# Patient Record
Sex: MALE
[De-identification: names, ages, dates, MRNs, and addresses within clinical notes are randomized per-mention and may not be internally consistent; named-entity substitution may affect disease eponyms.]

---

## 2017-09-18 PROBLEM — Z00.00 ENCOUNTER FOR PREVENTIVE HEALTH EXAMINATION: Status: ACTIVE | Noted: 2017-09-18

## 2019-03-11 ENCOUNTER — APPOINTMENT (OUTPATIENT)
Dept: GASTROENTEROLOGY | Facility: CLINIC | Age: 64
End: 2019-03-11

## 2022-12-15 ENCOUNTER — NON-APPOINTMENT (OUTPATIENT)
Age: 67
End: 2022-12-15

## 2022-12-16 ENCOUNTER — APPOINTMENT (OUTPATIENT)
Dept: HOME HEALTH SERVICES | Facility: HOME HEALTH | Age: 67
End: 2022-12-16

## 2022-12-16 VITALS
HEIGHT: 65 IN | RESPIRATION RATE: 18 BRPM | DIASTOLIC BLOOD PRESSURE: 72 MMHG | HEART RATE: 54 BPM | WEIGHT: 135 LBS | SYSTOLIC BLOOD PRESSURE: 112 MMHG | OXYGEN SATURATION: 99 % | TEMPERATURE: 97.2 F | BODY MASS INDEX: 22.49 KG/M2

## 2022-12-16 DIAGNOSIS — Z80.0 FAMILY HISTORY OF MALIGNANT NEOPLASM OF DIGESTIVE ORGANS: ICD-10-CM

## 2022-12-16 DIAGNOSIS — F17.200 NICOTINE DEPENDENCE, UNSPECIFIED, UNCOMPLICATED: ICD-10-CM

## 2022-12-16 DIAGNOSIS — Z87.448 PERSONAL HISTORY OF OTHER DISEASES OF URINARY SYSTEM: ICD-10-CM

## 2022-12-16 DIAGNOSIS — K21.9 GASTRO-ESOPHAGEAL REFLUX DISEASE W/OUT ESOPHAGITIS: ICD-10-CM

## 2022-12-16 DIAGNOSIS — I73.9 PERIPHERAL VASCULAR DISEASE, UNSPECIFIED: ICD-10-CM

## 2022-12-16 DIAGNOSIS — Z23 ENCOUNTER FOR IMMUNIZATION: ICD-10-CM

## 2022-12-16 DIAGNOSIS — Z78.9 OTHER SPECIFIED HEALTH STATUS: ICD-10-CM

## 2022-12-16 PROCEDURE — 99344 HOME/RES VST NEW MOD MDM 60: CPT | Mod: 25,95

## 2022-12-16 PROCEDURE — G0506: CPT | Mod: 95

## 2022-12-19 PROBLEM — Z87.448 HISTORY OF END STAGE RENAL DISEASE: Status: RESOLVED | Noted: 2022-12-19 | Resolved: 2022-12-19

## 2022-12-19 PROBLEM — I73.9 INTERMITTENT CLAUDICATION: Status: ACTIVE | Noted: 2022-12-19

## 2022-12-19 PROBLEM — Z23 ENCOUNTER FOR IMMUNIZATION: Status: ACTIVE | Noted: 2022-12-19

## 2022-12-19 PROBLEM — K21.9 GERD (GASTROESOPHAGEAL REFLUX DISEASE): Status: ACTIVE | Noted: 2022-12-19

## 2022-12-19 RX ORDER — ISOPRPOPYL ALCOHOL 70 ML/100ML
70 SWAB TOPICAL
Qty: 100 | Refills: 0 | Status: ACTIVE | COMMUNITY
Start: 2022-11-18

## 2022-12-19 RX ORDER — AMMONIUM LACTATE 12 %
12 CREAM (GRAM) TOPICAL
Qty: 385 | Refills: 0 | Status: COMPLETED | COMMUNITY
Start: 2022-11-30

## 2022-12-19 RX ORDER — KETOCONAZOLE 20 MG/G
2 CREAM TOPICAL
Qty: 60 | Refills: 0 | Status: COMPLETED | COMMUNITY
Start: 2022-10-02

## 2022-12-19 RX ORDER — BLOOD SUGAR DIAGNOSTIC
STRIP MISCELLANEOUS
Qty: 100 | Refills: 0 | Status: ACTIVE | COMMUNITY
Start: 2022-06-30

## 2022-12-19 RX ORDER — PEN NEEDLE, DIABETIC 31 GX5/16"
32G X 4 MM NEEDLE, DISPOSABLE MISCELLANEOUS
Qty: 200 | Refills: 0 | Status: ACTIVE | COMMUNITY
Start: 2022-06-30

## 2022-12-19 RX ORDER — ALCOHOL ANTISEPTIC PADS
PADS, MEDICATED (EA) TOPICAL
Qty: 100 | Refills: 0 | Status: ACTIVE | COMMUNITY
Start: 2022-12-01

## 2022-12-19 NOTE — HEALTH RISK ASSESSMENT
[Independent] : managing medications [Some assistance needed] : managing finances [No falls in past year] : Patient reported no falls in the past year [No] : The patient does not have visual impairment [TimeGetUpGo] : 8

## 2022-12-19 NOTE — PHYSICAL EXAM
[No Acute Distress] : no acute distress [Well Developed] : well developed [Normal Outer Ear/Nose] : the ears and nose were normal in appearance [No Respiratory Distress] : no respiratory distress [Clear to Auscultation] : lungs were clear to auscultation bilaterally [Normal S1, S2] : normal S1 and S2 [No Edema] : there was no peripheral edema [Normal Bowel Sounds] : normal bowel sounds [No Rash] : no rash [No Skin Lesions] : no skin lesions [Oriented x3] : oriented to person, place, and time [Normal Mood] : the mood was normal [de-identified] : Vitals taken by RN and reviewed. Physical exam performed by RN  [de-identified] : RUE AVF with thrill +

## 2022-12-19 NOTE — HISTORY OF PRESENT ILLNESS
[Patient] : patient [FreeTextEntry1] : n/a [FreeTextEntry2] : CODIE LOPEZ is being seen for a visit provided via [employer] real-time audio visual technology. CODIE LOPEZ was located at their home, [address] [place] [address], [address] [place] [address], at the time of the visit along with RN, Hillary Neri. \par \par The [facility] clinician, BASSAM COLINDRES, was located remotely at their home in [place] at the time of the visit. The patient, CODIE LOPEZ, and the [facility] clinician, BASSAM COLINDRES, participated in the telehealth encounter. Other participants included the RN, who was in the home with the patient, performed vitals monitoring and physical exam, and facilitated the video visit with BASSAM COLINDRES. The assessment and plan was made on the basis of the information provided by the RN\par \par \par CODIE LOPEZ is a Telugu speaking 67 year male with PMH of BPH, overactive bladder, GERD, Vitamin D deficiency, thrombocytopenia, HTN, DM and ESRD (s/p renal transplant ) being seen for initial evaluation. Patient is concerned about his urinary frequency. He was seen by urology and had extensive testing. He was prescribed medications but he feels its not working. He denies any fever, dysuria but reports nocturia and incomplete voiding. \par \par Interval Events: none\par \par Subjective:\par -Appetite/weight: appetite fait. Weight stable.\par -Gait/falls: Gait stable. No falls. \par -Pain: Bilateral hip pain radiating to knee and back pain leads to decreased ambulation. Occurs with ambulation. Improves with rest. No prior accidents. No imaging. Tylenol prn but doesn't relieve. \par -Sleep: sleeps wells. \par -BMs: regular. no straining. \par -Urine: nocturia, incomplete voiding. Urology evaluated, tests were done and meds prescribed. Needs referral to another urology. \par -Skin: intact \par -DME: none \par -Mood/memory: mood and memory is good. \par -Communication: conversational. speaks Yi. minimal medical literacy \par -Health Maintenance: colonoscopy 2022. vaccines UTD \par -Hospitalizations in the past year: none \par \par \par Medical Issues:\par a) BPH: Chronic continue finasteride and Tamsulosin. \par b) Overactive bladder: Evaluated by urology and had workup. Patient prescribed Myrbetriq and Oxybutinin but feels like its not working. Would like referral to another urology. Given. \par c) Claudication??: based on symptoms description. May need COLTNO and vascular evaluation. \par d) ESRD (s/p renal transplant 2018): Follows with nephrology. Continue Mycophenolate and Tacrolimus. \par e) HTN: Chronic. Continue amlodipine and Losartan.\par f) GERD: Continue pantoprazole. \par g) DM:  Insulin dependent. Unknown A1c. No BG log to review. Continue Basaglar and Novolog. \par h) HLD: Chronic. Continue Rosuvastatin \par \par Specialists:\par Transplant-Dr Shari Castro ([phone])\par PCP- Dr Rina Garcia ([phone]) \par [phone])\par \par Social hx: lives with son, , had a son  3 years ago, worked in kitchen doesn't work now. \par Caregivers: none \par MOLST: DNR, DNI, Hospitalize, IV and antibiotics ok, no feeding tube\par HCP:  Ludwig Lopez (son) \par

## 2022-12-19 NOTE — REASON FOR VISIT
[Initial Eval - Existing Diagnosis] : an initial evaluation of an existing diagnosis [[employer] ] : provided by [employer]   [Time Spent: ____ minutes] : Total time spent using  services: [unfilled] minutes. The patient's primary language is not English thus required  services. [Pre-Visit Preparation] : pre-visit preparation was done [Intercurrent Specialty/Sub-specialty Visits] : the patient has intercurrent specialty/sub-specialty visits [Interpreters_IDNumber] : [id number] [Interpreters_FullName] : Enrrique  [FreeTextEntry2] : medical records [FreeTextEntry3] : urology

## 2022-12-19 NOTE — COUNSELING
[Normal Weight - ( BMI  <25 )] : normal weight - ( BMI  <25 ) [DASH diet recommended] : DASH diet recommended [Smoker, not interested in quitting] : smoker, not interested in quitting [Use assistive device to avoid falls] : use assistive device to avoid falls [Remove clutter and unsafe carpeting to avoid falls] : remove clutter and unsafe carpeting to avoid falls [Date: ___] : colonoscopy completed on [unfilled] [] : foot exam [Completed] : Aspirin use discussion completed [Improve pain control] : improve pain control [Maintain functional ability] : maintain functional ability [Advanced Directives discussed: ____] : Advanced directives discussed: [unfilled] [Completed Medical Orders for Life-Sustaining Treatment] : completed medical orders for life-sustaining treatment [DNR] : Code Status: DNR [Limited] : Treatment Guidelines: Limited [DNI] : Intubation: DNI [[facility]: _____] [facility] [unfilled] [_____] : HCP: [unfilled] [ - New patient with 2 or more chronic conditions; CCM discussed and patient-centered care plan established] : New patient with 2 or more chronic conditions; CCM discussed and patient-centered care plan established

## 2022-12-20 DIAGNOSIS — G89.29 PAIN IN LEG, UNSPECIFIED: ICD-10-CM

## 2022-12-20 DIAGNOSIS — R26.81 UNSTEADINESS ON FEET: ICD-10-CM

## 2022-12-20 DIAGNOSIS — M79.606 PAIN IN LEG, UNSPECIFIED: ICD-10-CM

## 2022-12-22 ENCOUNTER — APPOINTMENT (OUTPATIENT)
Dept: UROLOGY | Facility: CLINIC | Age: 67
End: 2022-12-22

## 2022-12-22 PROCEDURE — 99204 OFFICE O/P NEW MOD 45 MIN: CPT

## 2022-12-27 NOTE — HISTORY OF PRESENT ILLNESS
[FreeTextEntry1] : cc bph\par pt is  yo male referred for bph . The patient reports frequency x 10 nocturia x8\par  . He denies straining  hematuria and dysuria  The patient states symptoms are of  sever severity. The symptoms have been present for years  . He reports a history of no complicating symptoms. He denies flank pain, gross hematuria, kidney stones and recurrent UTI.\par  Prior treatments include .tamsulosin/finasteride/oxybutini/myrbettriq\par last psa unknown\par The is no family history of prostate cancer\par h/o esrd d/p tx 2018\par no constipation only decaf tea

## 2022-12-28 LAB
APPEARANCE: CLEAR
BACTERIA UR CULT: NORMAL
BACTERIA: NEGATIVE
BILIRUBIN URINE: NEGATIVE
BLOOD URINE: NEGATIVE
COLOR: YELLOW
GLUCOSE QUALITATIVE U: NEGATIVE
HYALINE CASTS: 0 /LPF
KETONES URINE: NEGATIVE
LEUKOCYTE ESTERASE URINE: NEGATIVE
MICROSCOPIC-UA: NORMAL
NITRITE URINE: NEGATIVE
PH URINE: 5.5
PROTEIN URINE: ABNORMAL
RED BLOOD CELLS URINE: 0 /HPF
SPECIFIC GRAVITY URINE: 1.02
SQUAMOUS EPITHELIAL CELLS: 0 /HPF
UROBILINOGEN URINE: NORMAL
WHITE BLOOD CELLS URINE: 0 /HPF

## 2022-12-29 ENCOUNTER — APPOINTMENT (OUTPATIENT)
Dept: UROLOGY | Facility: CLINIC | Age: 67
End: 2022-12-29

## 2022-12-29 VITALS
TEMPERATURE: 98.7 F | HEART RATE: 57 BPM | BODY MASS INDEX: 22.16 KG/M2 | HEIGHT: 65 IN | SYSTOLIC BLOOD PRESSURE: 136 MMHG | DIASTOLIC BLOOD PRESSURE: 63 MMHG | OXYGEN SATURATION: 99 % | WEIGHT: 133 LBS

## 2022-12-29 PROCEDURE — 52000 CYSTOURETHROSCOPY: CPT

## 2023-01-19 ENCOUNTER — APPOINTMENT (OUTPATIENT)
Dept: UROLOGY | Facility: CLINIC | Age: 68
End: 2023-01-19
Payer: MEDICARE

## 2023-01-19 VITALS
TEMPERATURE: 97.9 F | DIASTOLIC BLOOD PRESSURE: 76 MMHG | HEIGHT: 65 IN | WEIGHT: 133 LBS | BODY MASS INDEX: 22.16 KG/M2 | HEART RATE: 68 BPM | SYSTOLIC BLOOD PRESSURE: 146 MMHG

## 2023-01-19 PROCEDURE — 51736 URINE FLOW MEASUREMENT: CPT

## 2023-01-19 PROCEDURE — 51728 CYSTOMETROGRAM W/VP: CPT

## 2023-01-19 PROCEDURE — 51784 ANAL/URINARY MUSCLE STUDY: CPT

## 2023-01-19 PROCEDURE — 51797 INTRAABDOMINAL PRESSURE TEST: CPT

## 2023-01-23 ENCOUNTER — APPOINTMENT (OUTPATIENT)
Dept: UROLOGY | Facility: CLINIC | Age: 68
End: 2023-01-23
Payer: MEDICARE

## 2023-01-23 PROCEDURE — 81003 URINALYSIS AUTO W/O SCOPE: CPT | Mod: QW

## 2023-01-23 PROCEDURE — 99215 OFFICE O/P EST HI 40 MIN: CPT

## 2023-01-23 NOTE — HISTORY OF PRESENT ILLNESS
[FreeTextEntry1] : 67M presents for initial evaluation of LUTS \par Referred by Dr. Drew \par  \par PMH significant for: DM (a1c: <7), HTN, HLD, claudication \par PSH significant for: Renal transplant \par Significant meds: insulin, MMR, tamsulosin, finasteride, myrbetriq \par \par Patient reports 3 year-long history of OAB\par Reports severe level of distress \par Associated with urgency, frequency \par Previously treated with:\par      flomax + finasteride - no effect\par       Myrbetriq - marginal effect\par       oxybutynin - marginal effect \par \par Cysto (12/29, Tare): WNL \par UDS (1/19, Tare): limited study, DO, capacity 142, unable to comment on COE \par \par Daytime Frequency: 15\par Nighttime Frequency: 9-10\par Pads per day: 0\par Straining to void: 0\par Intermittency: No\par Dribbling: No\par Subjective Incomplete Emptying: frequently \par UTIs this past year: 0\par \par Daily Fluid Total: 40 oz\par Daily Caffeine Total: 0 oz\par \par Neurologic Hx, Vision or Balance changes: No\par Erections: WNL\par \par IPSS: 18, almost completely irritative\par QOL: 5, unhappy\par \par \par

## 2023-01-23 NOTE — ASSESSMENT
[FreeTextEntry1] : Mr. BARNES has seen me today for an evaluation of overactive bladder (OAB) (urinary urgency, frequency, and urge incontinence).  We have extensively reviewed the OAB care pathway and discussed: \par \par 1st line therapy:  \par       -Behavioral therapy -limiting fluid intake, caffeine, and artificial sweeteners; maintaining a normal weight \par       -Medical comorbidities - managing sleep apnea, diabetes, lower extremity edema, and CHF if present  \par       -Conservative management options - observation, pelvic floor physical therapy \par \par 2nd line therapy:  \par       -Medications (anticholinergics, Beta-agonists)\par  \par 3rd line therapy:  \par       -Bladder botulinum toxin injection \par       -Sacral neuromodulation \par       -Tibial nerve stimulation. \par \par The patient has exhibited at least 1 year of urinary urgency, frequency despite behavioral modification and trials with 2 medications: oxybutynin, Myrbetriq The patient would like to proceed with Bladder Botox. RBA discussed. All questions answered\par \par \par

## 2023-01-24 LAB
BILIRUB UR QL STRIP: NEGATIVE
CLARITY UR: CLEAR
GLUCOSE UR-MCNC: NEGATIVE
HCG UR QL: 5 EU/DL
HGB UR QL STRIP.AUTO: NEGATIVE
KETONES UR-MCNC: NEGATIVE
LEUKOCYTE ESTERASE UR QL STRIP: NEGATIVE
NITRITE UR QL STRIP: NEGATIVE
PH UR STRIP: 5
PROT UR STRIP-MCNC: NEGATIVE
SP GR UR STRIP: 1.02

## 2023-02-06 ENCOUNTER — APPOINTMENT (OUTPATIENT)
Dept: UROLOGY | Facility: CLINIC | Age: 68
End: 2023-02-06
Payer: MEDICARE

## 2023-02-06 VITALS
BODY MASS INDEX: 22.16 KG/M2 | TEMPERATURE: 98.7 F | DIASTOLIC BLOOD PRESSURE: 71 MMHG | OXYGEN SATURATION: 99 % | HEART RATE: 66 BPM | WEIGHT: 133 LBS | SYSTOLIC BLOOD PRESSURE: 146 MMHG | HEIGHT: 65 IN

## 2023-02-06 PROCEDURE — 52287 CYSTOSCOPY CHEMODENERVATION: CPT

## 2023-03-06 ENCOUNTER — APPOINTMENT (OUTPATIENT)
Dept: UROLOGY | Facility: CLINIC | Age: 68
End: 2023-03-06
Payer: MEDICARE

## 2023-03-06 VITALS
HEART RATE: 64 BPM | DIASTOLIC BLOOD PRESSURE: 60 MMHG | HEIGHT: 65 IN | WEIGHT: 133 LBS | TEMPERATURE: 97.9 F | SYSTOLIC BLOOD PRESSURE: 104 MMHG | OXYGEN SATURATION: 97 % | BODY MASS INDEX: 22.16 KG/M2

## 2023-03-06 DIAGNOSIS — N52.9 MALE ERECTILE DYSFUNCTION, UNSPECIFIED: ICD-10-CM

## 2023-03-06 PROCEDURE — 99214 OFFICE O/P EST MOD 30 MIN: CPT

## 2023-03-06 PROCEDURE — 51798 US URINE CAPACITY MEASURE: CPT

## 2023-03-06 NOTE — PHYSICAL EXAM
[General Appearance - Well Developed] : well developed [General Appearance - Well Nourished] : well nourished [Abdomen Soft] : soft [Abdomen Tenderness] : non-tender [Costovertebral Angle Tenderness] : no ~M costovertebral angle tenderness

## 2023-03-06 NOTE — ASSESSMENT
[FreeTextEntry1] : Mr. Lopez presents for follow up\par \par h/o OAB\par Managed with Myrbetriq + Bladder Botox 100 u\par 70% improvement, no AE, happy with results\par \par h/o ED\par Previously used unknown PDE5i w/o complaints\par No nitrate use\par Discussed RBA or PDE5i at length. Reviewed the medications amplify but do not induce and erection. Patient should take it 30 mins before sexual activity in the absence of a heavy meal beforehand. Risks include HA, myalgia, flushing, GI upset, and vision changes. If patient obtain an erection that does not go away, he will need to dasha. the office or proceed to the nearest ED.\par \par Recommendations\par -Screening PSA\par -d/c Myrbetriq - stay off medication if no deterioration of sxs\par -start sildenafil 100 mg PRN\par -RTC 3 mo \par \par

## 2023-03-06 NOTE — HISTORY OF PRESENT ILLNESS
[FreeTextEntry1] : 67M presents for follow up evaluation \par \par h/o OAB s/p 100u botox on 2/6 \par Daytime Frequency: 15--> 10\par Nighttime Frequency: 9-10--> 2-3\par continues to take myrbetriq 50\par Reports >70% improvement in symptoms\par \par Happy with results \par Denies adverse effects\par Voiding well w/o difficulty\par PVR: 90\par \par h/o ED\par Reports inability to achieve erection satisfactory for penetration w/o meds\par Previous PDE5i medication use with good effect, no AE\par No significant cardiac hx or contraindication to sex

## 2023-05-31 ENCOUNTER — NON-APPOINTMENT (OUTPATIENT)
Age: 68
End: 2023-05-31

## 2023-06-08 ENCOUNTER — APPOINTMENT (OUTPATIENT)
Dept: UROLOGY | Facility: CLINIC | Age: 68
End: 2023-06-08

## 2023-08-22 ENCOUNTER — APPOINTMENT (OUTPATIENT)
Dept: HOME HEALTH SERVICES | Facility: HOME HEALTH | Age: 68
End: 2023-08-22
Payer: MEDICARE

## 2023-08-22 VITALS
HEART RATE: 62 BPM | OXYGEN SATURATION: 98 % | SYSTOLIC BLOOD PRESSURE: 110 MMHG | TEMPERATURE: 98.6 F | DIASTOLIC BLOOD PRESSURE: 70 MMHG | RESPIRATION RATE: 18 BRPM

## 2023-08-22 DIAGNOSIS — N32.81 OVERACTIVE BLADDER: ICD-10-CM

## 2023-08-22 PROCEDURE — 99342 HOME/RES VST NEW LOW MDM 30: CPT | Mod: 25

## 2023-08-22 RX ORDER — FLUCONAZOLE 100 MG/1
100 TABLET ORAL
Qty: 7 | Refills: 0 | Status: COMPLETED | COMMUNITY
Start: 2022-12-12 | End: 2023-08-22

## 2023-08-22 RX ORDER — ROSUVASTATIN CALCIUM 5 MG/1
5 TABLET, FILM COATED ORAL
Qty: 90 | Refills: 0 | Status: COMPLETED | COMMUNITY
Start: 2022-12-02 | End: 2023-08-22

## 2023-08-22 RX ORDER — OXYBUTYNIN CHLORIDE 15 MG/1
15 TABLET, EXTENDED RELEASE ORAL
Qty: 90 | Refills: 3 | Status: COMPLETED | COMMUNITY
Start: 2022-12-16 | End: 2023-08-22

## 2023-08-22 NOTE — REASON FOR VISIT
[Follow-Up] : a follow-up visit [[employer] ] : provided by [employer]   [Time Spent: ____ minutes] : Total time spent using  services: [unfilled] minutes. The patient's primary language is not English thus required  services. [Pre-Visit Preparation] : pre-visit preparation was done [Intercurrent Specialty/Sub-specialty Visits] : the patient has intercurrent specialty/sub-specialty visits [Interpreters_IDNumber] : [id number] [Interpreters_FullName] : Alfreda [FreeTextEntry2] : Medical Record [FreeTextEntry3] : PCP and Nephrologist

## 2023-08-22 NOTE — HEALTH RISK ASSESSMENT
[Some assistance needed] : managing finances [No falls in past year] : Patient reported no falls in the past year [No] : The patient does not have visual impairment [Independent] : shopping [TimeGetUpGo] : 5

## 2023-08-22 NOTE — HISTORY OF PRESENT ILLNESS
[Patient] : patient [[facility] Co-Management Patient] : [unfilled] is a [facility] co-management patient [Patient is stable] : patient is stable [Education provided] : education provided [LastPCPVisitDate] : Last week [FreeTextEntry4] :  Nephrologist Dr Shari Castro  [LastSpecialistVisitDate] : 2 weeks ago [FreeTextEntry1] : n/a [FreeTextEntry2] : PMH of BPH, overactive bladder, GERD, Vitamin D deficiency, thrombocytopenia, HTN, DM and ESRD (s/p renal transplant 2018) , Right AVF + Bruit, + Thrill.  Interval Events: none  Subjective: -Appetite/weight: appetite good. Weight stable. -Gait/falls: Gait stable. No falls.  -Pain: denies -Sleep: sleeps wells.  -BMs: regular.  -Urine: no issues. -Skin: intact  -DME: none  -Mood/memory: mood and memory good.  -Communication: conversational. speaks Swedish. minimal medical literacy  -Health Maintenance: colonoscopy 2022. vaccines UTD  -Hospitalizations in the past year: none    Medical Issues: a) BPH: Chronic continue finasteride and Tamsulosin.  b) Overactive bladder: on Myrbetriq and Oxybutinin.  c) Claudication??: based on symptoms description. May need COLTON and vascular evaluation. Pt not sure if test was done, d) ESRD (s/p renal transplant 2018): Follows with nephrology. on Mycophenolate and Tacrolimus.  e) HTN: Chronic. on Amlodipine and Losartan. f) GERD: on pantoprazole.  g) DM:  Insulin dependent. Unknown A1c. No BG log to review. Continue Basaglar and Novolog. BS  h) HLD: Chronic. on Atorvastatin e) Chronic Back pain- On Gabapentin with good relief.  Specialists: Transplant-Dr Shari Castro ([phone]) PCP- Dr Rina Garcia ([phone])  Hematologist-([phone])  Social hx: lives with son, , had a son  3 years ago. Caregivers: none  MOLST: DNR, DNI, Hospitalize, IV and antibiotics ok, no feeding tube. HCP:  uLdwig Lopez (son)

## 2023-08-22 NOTE — COUNSELING
[Normal Weight - ( BMI  <25 )] : normal weight - ( BMI  <25 ) [DASH diet recommended] : DASH diet recommended [Smoker, not interested in quitting] : smoker, not interested in quitting [Use assistive device to avoid falls] : use assistive device to avoid falls [Remove clutter and unsafe carpeting to avoid falls] : remove clutter and unsafe carpeting to avoid falls [Date: ___] : colonoscopy completed on [unfilled] [] : foot exam [Completed] : Aspirin use discussion completed [Improve pain control] : improve pain control [Maintain functional ability] : maintain functional ability [Advanced Directives discussed: ____] : Advanced directives discussed: [unfilled] [Completed Medical Orders for Life-Sustaining Treatment] : completed medical orders for life-sustaining treatment [DNR] : Code Status: DNR [Limited] : Treatment Guidelines: Limited [DNI] : Intubation: DNI [Last Verification Date: _____] : [facility]/last verification date: [unfilled] [_____] : HCP: [unfilled] [FreeTextEntry3] : Renal diet, Const Carb diet

## 2023-10-11 ENCOUNTER — NON-APPOINTMENT (OUTPATIENT)
Age: 68
End: 2023-10-11

## 2023-11-29 ENCOUNTER — APPOINTMENT (OUTPATIENT)
Dept: HOME HEALTH SERVICES | Facility: HOME HEALTH | Age: 68
End: 2023-11-29
Payer: MEDICAID

## 2023-11-29 VITALS
OXYGEN SATURATION: 97 % | RESPIRATION RATE: 16 BRPM | TEMPERATURE: 98.06 F | DIASTOLIC BLOOD PRESSURE: 60 MMHG | SYSTOLIC BLOOD PRESSURE: 100 MMHG | HEART RATE: 70 BPM

## 2023-11-29 DIAGNOSIS — E78.5 HYPERLIPIDEMIA, UNSPECIFIED: ICD-10-CM

## 2023-11-29 DIAGNOSIS — N40.0 BENIGN PROSTATIC HYPERPLASIA WITHOUT LOWER URINARY TRACT SYMPMS: ICD-10-CM

## 2023-11-29 DIAGNOSIS — Z94.0 KIDNEY TRANSPLANT STATUS: ICD-10-CM

## 2023-11-29 DIAGNOSIS — E11.9 TYPE 2 DIABETES MELLITUS W/OUT COMPLICATIONS: ICD-10-CM

## 2023-11-29 DIAGNOSIS — Z79.4 TYPE 2 DIABETES MELLITUS W/OUT COMPLICATIONS: ICD-10-CM

## 2023-11-29 DIAGNOSIS — I10 ESSENTIAL (PRIMARY) HYPERTENSION: ICD-10-CM

## 2023-11-29 PROCEDURE — 99349 HOME/RES VST EST MOD MDM 40: CPT

## 2023-11-30 ENCOUNTER — NON-APPOINTMENT (OUTPATIENT)
Age: 68
End: 2023-11-30

## 2023-11-30 ENCOUNTER — TRANSCRIPTION ENCOUNTER (OUTPATIENT)
Age: 68
End: 2023-11-30

## 2023-12-03 ENCOUNTER — NON-APPOINTMENT (OUTPATIENT)
Age: 68
End: 2023-12-03

## 2023-12-03 PROBLEM — E78.5 HYPERLIPIDEMIA: Status: ACTIVE | Noted: 2022-12-19

## 2023-12-03 PROBLEM — Z94.0 RENAL TRANSPLANT, STATUS POST: Status: ACTIVE | Noted: 2022-12-19

## 2023-12-03 PROBLEM — N40.0 BPH WITHOUT URINARY OBSTRUCTION: Status: ACTIVE | Noted: 2022-12-19

## 2023-12-03 PROBLEM — E11.9 TYPE 2 DIABETES MELLITUS WITHOUT COMPLICATION, WITH LONG-TERM CURRENT USE OF INSULIN: Status: ACTIVE | Noted: 2022-12-19

## 2023-12-03 PROBLEM — I10 HYPERTENSION, UNSPECIFIED TYPE: Status: ACTIVE | Noted: 2022-12-19

## 2023-12-03 RX ORDER — MIRABEGRON 50 MG/1
50 TABLET, FILM COATED, EXTENDED RELEASE ORAL
Qty: 30 | Refills: 5 | Status: ACTIVE | COMMUNITY
Start: 2022-12-16

## 2023-12-03 RX ORDER — CLOTRIMAZOLE 10 MG/1
10 LOZENGE ORAL
Qty: 35 | Refills: 0 | Status: COMPLETED | COMMUNITY
Start: 2022-12-12 | End: 2023-12-03

## 2023-12-03 RX ORDER — TAMSULOSIN HYDROCHLORIDE 0.4 MG/1
0.4 CAPSULE ORAL
Qty: 1 | Refills: 3 | Status: ACTIVE | COMMUNITY
Start: 2022-12-16

## 2023-12-03 RX ORDER — AMLODIPINE BESYLATE 5 MG/1
5 TABLET ORAL
Qty: 90 | Refills: 3 | Status: ACTIVE | COMMUNITY
Start: 2022-12-12

## 2023-12-03 RX ORDER — FINASTERIDE 5 MG/1
5 TABLET, FILM COATED ORAL DAILY
Qty: 90 | Refills: 3 | Status: ACTIVE | COMMUNITY
Start: 2022-12-16

## 2023-12-03 RX ORDER — INSULIN GLARGINE 100 [IU]/ML
100 INJECTION, SOLUTION SUBCUTANEOUS
Qty: 30 | Refills: 3 | Status: ACTIVE | COMMUNITY
Start: 2022-10-14

## 2023-12-03 RX ORDER — ERGOCALCIFEROL 1.25 MG/1
1.25 MG CAPSULE, LIQUID FILLED ORAL
Qty: 8 | Refills: 0 | Status: ACTIVE | COMMUNITY
Start: 2022-12-16

## 2023-12-03 RX ORDER — ONABOTULINUMTOXINA 100 [USP'U]/1
100 INJECTION, POWDER, LYOPHILIZED, FOR SOLUTION INTRADERMAL; INTRAMUSCULAR
Qty: 1 | Refills: 0 | Status: COMPLETED | COMMUNITY
Start: 2023-01-23 | End: 2023-12-03

## 2023-12-03 RX ORDER — MYCOPHENOLATE MOFETIL 250 MG/1
250 CAPSULE ORAL
Refills: 0 | Status: ACTIVE | COMMUNITY
Start: 2022-12-16

## 2023-12-03 RX ORDER — LOSARTAN POTASSIUM 50 MG/1
50 TABLET, FILM COATED ORAL
Qty: 90 | Refills: 3 | Status: ACTIVE | COMMUNITY
Start: 2022-12-16

## 2023-12-03 RX ORDER — SODIUM POLYSTYRENE SULFONATE 15 G/60ML
15 SUSPENSION ORAL; RECTAL
Qty: 473 | Refills: 0 | Status: COMPLETED | COMMUNITY
Start: 2022-12-13 | End: 2023-12-03

## 2023-12-03 RX ORDER — INSULIN ASPART 100 [IU]/ML
100 INJECTION, SOLUTION INTRAVENOUS; SUBCUTANEOUS
Refills: 0 | Status: ACTIVE | COMMUNITY
Start: 2022-06-30

## 2023-12-03 RX ORDER — SILDENAFIL 100 MG/1
100 TABLET, FILM COATED ORAL
Qty: 30 | Refills: 3 | Status: COMPLETED | COMMUNITY
Start: 2023-03-06 | End: 2023-12-03

## 2023-12-03 RX ORDER — ATORVASTATIN CALCIUM 10 MG/1
10 TABLET, FILM COATED ORAL
Refills: 0 | Status: ACTIVE | COMMUNITY
Start: 2023-08-22

## 2023-12-03 RX ORDER — TACROLIMUS 1 MG/1
1 CAPSULE ORAL TWICE DAILY
Refills: 0 | Status: ACTIVE | COMMUNITY
Start: 2022-12-16

## 2023-12-03 RX ORDER — PANTOPRAZOLE 40 MG/1
40 TABLET, DELAYED RELEASE ORAL DAILY
Qty: 1 | Refills: 3 | Status: COMPLETED | COMMUNITY
Start: 2022-12-16 | End: 2023-12-03

## 2023-12-03 RX ORDER — GABAPENTIN 100 MG/1
100 CAPSULE ORAL 3 TIMES DAILY
Refills: 0 | Status: ACTIVE | COMMUNITY
Start: 2023-08-22

## 2024-03-29 ENCOUNTER — APPOINTMENT (OUTPATIENT)
Dept: HOME HEALTH SERVICES | Facility: HOME HEALTH | Age: 69
End: 2024-03-29

## 2024-06-21 ENCOUNTER — APPOINTMENT (OUTPATIENT)
Dept: HOME HEALTH SERVICES | Facility: HOME HEALTH | Age: 69
End: 2024-06-21

## 2024-06-21 ENCOUNTER — NON-APPOINTMENT (OUTPATIENT)
Age: 69
End: 2024-06-21

## 2024-11-20 ENCOUNTER — APPOINTMENT (OUTPATIENT)
Dept: HOME HEALTH SERVICES | Facility: HOME HEALTH | Age: 69
End: 2024-11-20
Payer: MEDICARE

## 2024-11-20 VITALS
HEART RATE: 60 BPM | DIASTOLIC BLOOD PRESSURE: 70 MMHG | WEIGHT: 136 LBS | RESPIRATION RATE: 16 BRPM | HEIGHT: 65 IN | BODY MASS INDEX: 22.66 KG/M2 | TEMPERATURE: 98.24 F | OXYGEN SATURATION: 99 % | SYSTOLIC BLOOD PRESSURE: 122 MMHG

## 2024-11-20 DIAGNOSIS — N40.0 BENIGN PROSTATIC HYPERPLASIA WITHOUT LOWER URINARY TRACT SYMPMS: ICD-10-CM

## 2024-11-20 DIAGNOSIS — I10 ESSENTIAL (PRIMARY) HYPERTENSION: ICD-10-CM

## 2024-11-20 DIAGNOSIS — I73.9 PERIPHERAL VASCULAR DISEASE, UNSPECIFIED: ICD-10-CM

## 2024-11-20 DIAGNOSIS — Z79.4 TYPE 2 DIABETES MELLITUS W/OUT COMPLICATIONS: ICD-10-CM

## 2024-11-20 DIAGNOSIS — E11.9 TYPE 2 DIABETES MELLITUS W/OUT COMPLICATIONS: ICD-10-CM

## 2024-11-20 PROCEDURE — 99349 HOME/RES VST EST MOD MDM 40: CPT | Mod: 25

## 2024-11-20 RX ORDER — HYDROCHLOROTHIAZIDE 12.5 MG/1
12.5 CAPSULE ORAL DAILY
Qty: 90 | Refills: 3 | Status: ACTIVE | COMMUNITY
Start: 2024-11-20

## 2024-11-20 RX ORDER — NIFEDIPINE 90 MG/1
90 TABLET, EXTENDED RELEASE ORAL DAILY
Refills: 0 | Status: ACTIVE | COMMUNITY
Start: 2024-11-20

## 2024-12-13 ENCOUNTER — APPOINTMENT (OUTPATIENT)
Dept: HOME HEALTH SERVICES | Facility: HOME HEALTH | Age: 69
End: 2024-12-13

## 2024-12-17 ENCOUNTER — APPOINTMENT (OUTPATIENT)
Dept: HOME HEALTH SERVICES | Facility: HOME HEALTH | Age: 69
End: 2024-12-17

## 2024-12-24 ENCOUNTER — NON-APPOINTMENT (OUTPATIENT)
Age: 69
End: 2024-12-24

## 2024-12-31 ENCOUNTER — APPOINTMENT (OUTPATIENT)
Dept: HOME HEALTH SERVICES | Facility: HOME HEALTH | Age: 69
End: 2024-12-31

## 2025-01-08 ENCOUNTER — APPOINTMENT (OUTPATIENT)
Dept: HOME HEALTH SERVICES | Facility: HOME HEALTH | Age: 70
End: 2025-01-08

## 2025-01-08 VITALS
HEART RATE: 66 BPM | OXYGEN SATURATION: 18 % | TEMPERATURE: 208.4 F | SYSTOLIC BLOOD PRESSURE: 100 MMHG | DIASTOLIC BLOOD PRESSURE: 60 MMHG

## 2025-01-08 DIAGNOSIS — I73.9 PERIPHERAL VASCULAR DISEASE, UNSPECIFIED: ICD-10-CM

## 2025-01-08 DIAGNOSIS — E11.9 TYPE 2 DIABETES MELLITUS W/OUT COMPLICATIONS: ICD-10-CM

## 2025-01-08 DIAGNOSIS — Z79.4 TYPE 2 DIABETES MELLITUS W/OUT COMPLICATIONS: ICD-10-CM

## 2025-01-08 DIAGNOSIS — N40.0 BENIGN PROSTATIC HYPERPLASIA WITHOUT LOWER URINARY TRACT SYMPMS: ICD-10-CM

## 2025-01-08 PROCEDURE — 99349 HOME/RES VST EST MOD MDM 40: CPT

## 2025-01-08 RX ORDER — FINASTERIDE 5 MG/1
5 TABLET, FILM COATED ORAL DAILY
Qty: 90 | Refills: 0 | Status: ACTIVE | COMMUNITY
Start: 2025-01-08

## 2025-01-08 RX ORDER — APIXABAN 5 MG/1
5 TABLET, FILM COATED ORAL
Refills: 0 | Status: ACTIVE | COMMUNITY
Start: 2025-01-08

## 2025-01-08 RX ORDER — PANTOPRAZOLE 20 MG/1
20 TABLET, DELAYED RELEASE ORAL
Refills: 0 | Status: ACTIVE | COMMUNITY
Start: 2025-01-08

## 2025-01-08 RX ORDER — MYCOPHENOLATE MOFETIL 250 MG/1
250 CAPSULE ORAL TWICE DAILY
Refills: 0 | Status: ACTIVE | COMMUNITY
Start: 2025-01-08

## 2025-01-08 RX ORDER — TACROLIMUS 1 MG/1
1 CAPSULE ORAL
Refills: 0 | Status: ACTIVE | COMMUNITY
Start: 2025-01-08

## 2025-01-24 ENCOUNTER — NON-APPOINTMENT (OUTPATIENT)
Age: 70
End: 2025-01-24